# Patient Record
Sex: MALE | Race: ASIAN | HISPANIC OR LATINO | URBAN - METROPOLITAN AREA
[De-identification: names, ages, dates, MRNs, and addresses within clinical notes are randomized per-mention and may not be internally consistent; named-entity substitution may affect disease eponyms.]

---

## 2020-08-12 PROBLEM — H53.143 ASTHENOPIA: Noted: 2021-05-26

## 2020-08-12 PROBLEM — Z96.1 PSEUDOPHAKIA: Noted: 2021-05-26

## 2020-08-12 PROBLEM — H52.4 PRESBYOPIA: Noted: 2021-05-26

## 2020-08-12 PROBLEM — H35.371 EPIRETINAL MEMBRANE: Status: STABILIZING | Noted: 2020-08-12

## 2020-08-12 PROBLEM — H26.492 OTHER SECONDARY CATARACT: Noted: 2020-10-14

## 2020-08-12 PROBLEM — H35.373 EPIRETINAL MEMBRANE: Noted: 2020-08-12

## 2021-05-26 ENCOUNTER — PREPPED CHART (OUTPATIENT)
Dept: URBAN - METROPOLITAN AREA CLINIC 93 | Facility: CLINIC | Age: 66
End: 2021-05-26

## 2021-10-17 ENCOUNTER — PREPPED CHART (OUTPATIENT)
Dept: URBAN - METROPOLITAN AREA CLINIC 67 | Facility: CLINIC | Age: 66
End: 2021-10-17

## 2021-11-17 PROBLEM — H35.373 EPIRETINAL MEMBRANE: Noted: 2021-11-17

## 2021-11-17 PROBLEM — H35.373 EPIRETINAL MEMBRANE WITH PSEUDOHOLE: Noted: 2021-11-17

## 2021-11-17 PROBLEM — H04.123 DRY EYE SYNDROME: Noted: 2021-11-17

## 2021-11-17 PROBLEM — H35.341 LAMELLAR MACULAR HOLE: Noted: 2021-11-17

## 2021-11-17 PROBLEM — E11.9 DIABETES, TYPE II, NO OCULAR COMPLICATIONS: Noted: 2021-11-17

## 2021-11-17 PROBLEM — H43.813 POSTERIOR VITREOUS DETACHMENT: Noted: 2021-11-17

## 2022-02-04 ENCOUNTER — FOLLOW UP (OUTPATIENT)
Dept: URBAN - METROPOLITAN AREA CLINIC 93 | Facility: CLINIC | Age: 67
End: 2022-02-04

## 2022-02-04 DIAGNOSIS — H43.813: ICD-10-CM

## 2022-02-04 DIAGNOSIS — E11.9: ICD-10-CM

## 2022-02-04 DIAGNOSIS — Z96.1: ICD-10-CM

## 2022-02-04 DIAGNOSIS — H52.4: ICD-10-CM

## 2022-02-04 DIAGNOSIS — H35.371: ICD-10-CM

## 2022-02-04 PROCEDURE — 92012 INTRM OPH EXAM EST PATIENT: CPT

## 2022-02-04 PROCEDURE — 92134 CPTRZ OPH DX IMG PST SGM RTA: CPT

## 2022-02-04 PROCEDURE — 92015 DETERMINE REFRACTIVE STATE: CPT

## 2022-02-04 ASSESSMENT — TONOMETRY
OS_IOP_MMHG: 10
OD_IOP_MMHG: 10

## 2022-02-04 ASSESSMENT — VISUAL ACUITY
OS_CC: 20/30-2
OD_SC: 20/100-2

## 2022-08-12 ASSESSMENT — TONOMETRY
OS_IOP_MMHG: 14
OD_IOP_MMHG: 10

## 2022-08-12 ASSESSMENT — VISUAL ACUITY
OD_PH: 20/20+1
OD_CC: 20/20-2
OS_CC: 20/15

## 2022-08-17 ENCOUNTER — PROBLEM (OUTPATIENT)
Dept: URBAN - METROPOLITAN AREA CLINIC 93 | Facility: CLINIC | Age: 67
End: 2022-08-17

## 2022-08-17 DIAGNOSIS — H43.813: ICD-10-CM

## 2022-08-17 DIAGNOSIS — H53.2: ICD-10-CM

## 2022-08-17 DIAGNOSIS — H52.4: ICD-10-CM

## 2022-08-17 DIAGNOSIS — R73.09: ICD-10-CM

## 2022-08-17 DIAGNOSIS — Z96.1: ICD-10-CM

## 2022-08-17 DIAGNOSIS — H35.371: ICD-10-CM

## 2022-08-17 PROCEDURE — 92012 INTRM OPH EXAM EST PATIENT: CPT

## 2022-08-17 ASSESSMENT — VISUAL ACUITY
OD_PH: 20/30-1
OD_SC: J3
OS_CC: 20/30
OD_SC: 20/100

## 2022-08-17 ASSESSMENT — TONOMETRY
OD_IOP_MMHG: 10
OS_IOP_MMHG: 10

## 2023-01-13 ENCOUNTER — FOLLOW UP (OUTPATIENT)
Dept: URBAN - METROPOLITAN AREA CLINIC 67 | Facility: CLINIC | Age: 68
End: 2023-01-13

## 2023-01-13 DIAGNOSIS — H35.373: ICD-10-CM

## 2023-01-13 DIAGNOSIS — H43.813: ICD-10-CM

## 2023-01-13 DIAGNOSIS — E11.9: ICD-10-CM

## 2023-01-13 PROCEDURE — 92202 OPSCPY EXTND ON/MAC DRAW: CPT

## 2023-01-13 PROCEDURE — 92014 COMPRE OPH EXAM EST PT 1/>: CPT

## 2023-01-13 PROCEDURE — 92134 CPTRZ OPH DX IMG PST SGM RTA: CPT

## 2023-01-13 ASSESSMENT — VISUAL ACUITY
OD_SC: 20/50
OS_SC: 20/20

## 2023-01-13 ASSESSMENT — TONOMETRY
OD_IOP_MMHG: 11
OS_IOP_MMHG: 11

## 2024-01-26 ENCOUNTER — FOLLOW UP (OUTPATIENT)
Dept: URBAN - METROPOLITAN AREA CLINIC 67 | Facility: CLINIC | Age: 69
End: 2024-01-26

## 2024-01-26 DIAGNOSIS — H35.373: ICD-10-CM

## 2024-01-26 DIAGNOSIS — D31.32: ICD-10-CM

## 2024-01-26 DIAGNOSIS — H43.813: ICD-10-CM

## 2024-01-26 PROCEDURE — 92014 COMPRE OPH EXAM EST PT 1/>: CPT

## 2024-01-26 PROCEDURE — 92134 CPTRZ OPH DX IMG PST SGM RTA: CPT

## 2024-01-26 PROCEDURE — 92202 OPSCPY EXTND ON/MAC DRAW: CPT

## 2024-01-26 ASSESSMENT — TONOMETRY
OS_IOP_MMHG: 14
OD_IOP_MMHG: 13

## 2024-01-26 ASSESSMENT — VISUAL ACUITY
OD_PH: 20/50+2
OS_SC: 20/20-2
OD_SC: 20/70

## 2024-10-25 ENCOUNTER — PROBLEM (OUTPATIENT)
Dept: URBAN - METROPOLITAN AREA CLINIC 67 | Facility: CLINIC | Age: 69
End: 2024-10-25

## 2024-10-25 DIAGNOSIS — D31.32: ICD-10-CM

## 2024-10-25 DIAGNOSIS — H35.373: ICD-10-CM

## 2024-10-25 DIAGNOSIS — H43.813: ICD-10-CM

## 2024-10-25 PROCEDURE — 92134 CPTRZ OPH DX IMG PST SGM RTA: CPT | Mod: NC

## 2024-10-25 PROCEDURE — 92250 FUNDUS PHOTOGRAPHY W/I&R: CPT

## 2024-10-25 PROCEDURE — 92202 OPSCPY EXTND ON/MAC DRAW: CPT | Mod: NC

## 2024-10-25 PROCEDURE — 92014 COMPRE OPH EXAM EST PT 1/>: CPT

## 2024-10-25 ASSESSMENT — TONOMETRY
OD_IOP_MMHG: 12
OS_IOP_MMHG: 11

## 2024-10-25 ASSESSMENT — VISUAL ACUITY
OD_SC: 20/70-2
OD_PH: 20/50-2
OS_SC: 20/20-2

## 2024-12-31 ENCOUNTER — APPOINTMENT (OUTPATIENT)
Dept: URBAN - METROPOLITAN AREA CLINIC 41 | Facility: CLINIC | Age: 69
Setting detail: DERMATOLOGY
End: 2024-12-31

## 2024-12-31 DIAGNOSIS — L24 IRRITANT CONTACT DERMATITIS: ICD-10-CM | Status: INADEQUATELY CONTROLLED

## 2024-12-31 PROBLEM — L24.9 IRRITANT CONTACT DERMATITIS, UNSPECIFIED CAUSE: Status: ACTIVE | Noted: 2024-12-31

## 2024-12-31 PROCEDURE — ? PRESCRIPTION

## 2024-12-31 PROCEDURE — ? PRESCRIPTION MEDICATION MANAGEMENT

## 2024-12-31 PROCEDURE — 99203 OFFICE O/P NEW LOW 30 MIN: CPT

## 2024-12-31 PROCEDURE — ? COUNSELING

## 2024-12-31 RX ORDER — PREDNISONE 20 MG/1
TABLET ORAL
Qty: 19 | Refills: 0 | Status: ERX | COMMUNITY
Start: 2024-12-31

## 2024-12-31 RX ORDER — HYDROXYZINE HYDROCHLORIDE 25 MG/1
TABLET, FILM COATED ORAL
Qty: 30 | Refills: 1 | Status: ERX | COMMUNITY
Start: 2024-12-31

## 2024-12-31 RX ADMIN — PREDNISONE: 20 TABLET ORAL at 00:00

## 2024-12-31 RX ADMIN — HYDROXYZINE HYDROCHLORIDE: 25 TABLET, FILM COATED ORAL at 00:00

## 2024-12-31 NOTE — HPI: RASH (ECZEMA)
Is This A New Presentation, Or A Follow-Up?: Rash
Additional History: —\\nNew pt here for full body rash that started on 12/26 \\nPt went to urgent care yesterday \\nPt is taking 10mg prednisone QD (4 for 4 days, 3 for 3 days, 2 for two days, 1 for two days) hydrocortisone otc, sarna, calamine, and witch hazel

## 2024-12-31 NOTE — PROCEDURE: COUNSELING
Patient Specific Counseling (Will Not Stick From Patient To Patient): —\\nPt notes he has wore a new piece of clothing from [place] and the the rash started the next day\\nPt told to rto if rash doesn’t resolve within 2 weeks
Detail Level: Detailed

## 2024-12-31 NOTE — PROCEDURE: PRESCRIPTION MEDICATION MANAGEMENT
Detail Level: Zone
Render In Strict Bullet Format?: No
Initiate Treatment: Prednisone 40mg for one week and 20mg for one week \\nHydroxyzine 25mg QHS PRN itch